# Patient Record
Sex: FEMALE | Race: WHITE | NOT HISPANIC OR LATINO | URBAN - METROPOLITAN AREA
[De-identification: names, ages, dates, MRNs, and addresses within clinical notes are randomized per-mention and may not be internally consistent; named-entity substitution may affect disease eponyms.]

---
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
## 2020-01-01 ENCOUNTER — EXTERNAL RECORD (OUTPATIENT)
Dept: OTHER | Age: 20
End: 2020-01-01

## 2020-07-03 ENCOUNTER — WALK IN (OUTPATIENT)
Dept: URGENT CARE | Age: 20
End: 2020-07-03

## 2020-07-03 VITALS
HEIGHT: 60 IN | SYSTOLIC BLOOD PRESSURE: 134 MMHG | BODY MASS INDEX: 50.26 KG/M2 | DIASTOLIC BLOOD PRESSURE: 88 MMHG | HEART RATE: 106 BPM | OXYGEN SATURATION: 98 % | WEIGHT: 256 LBS | RESPIRATION RATE: 16 BRPM
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
## 2020-07-03 DIAGNOSIS — S61.259A CAT BITE OF FINGER, INITIAL ENCOUNTER: Primary | ICD-10-CM

## 2020-07-03 PROCEDURE — 90471 IMMUNIZATION ADMIN: CPT | Performed by: PHYSICIAN ASSISTANT

## 2020-07-03 PROCEDURE — 99202 OFFICE O/P NEW SF 15 MIN: CPT | Performed by: PHYSICIAN ASSISTANT

## 2020-07-03 PROCEDURE — 90715 TDAP VACCINE 7 YRS/> IM: CPT | Performed by: PHYSICIAN ASSISTANT

## 2020-07-03 RX ORDER — NORTRIPTYLINE HYDROCHLORIDE 50 MG/1
50 CAPSULE ORAL NIGHTLY
COMMUNITY

## 2020-07-03 RX ORDER — ESCITALOPRAM OXALATE 20 MG/1
20 TABLET ORAL DAILY
COMMUNITY

## 2020-07-03 RX ORDER — AMOXICILLIN AND CLAVULANATE POTASSIUM 875; 125 MG/1; MG/1
TABLET, FILM COATED ORAL
Qty: 10 TABLET | Refills: 0 | Status: SHIPPED | OUTPATIENT
Start: 2020-07-03

## 2020-07-23 ENCOUNTER — CLINICAL ABSTRACT (OUTPATIENT)
Dept: HEALTH INFORMATION MANAGEMENT | Age: 20
End: 2020-07-23

## 2022-02-12 VITALS
HEART RATE: 76 BPM | BODY MASS INDEX: 37.5 KG/M2 | BODY MASS INDEX: 36.83 KG/M2 | SYSTOLIC BLOOD PRESSURE: 124 MMHG | TEMPERATURE: 98.1 F | WEIGHT: 191 LBS | WEIGHT: 187 LBS | TEMPERATURE: 98.3 F | HEIGHT: 60 IN | HEART RATE: 88 BPM | DIASTOLIC BLOOD PRESSURE: 72 MMHG

## 2022-02-12 VITALS — SYSTOLIC BLOOD PRESSURE: 128 MMHG | DIASTOLIC BLOOD PRESSURE: 88 MMHG | HEART RATE: 80 BPM | WEIGHT: 185 LBS

## 2022-02-16 NOTE — PROGRESS NOTES
Patient:   JAISON ARRIAZA            MRN: [id number]            FIN: [id number]               Age:   16 years     Sex:  Female     :  2000   Associated Diagnoses:   Abdominal pain   Author:   Donna Rg      Chief Complaint   3/30/2017 3:17 PM CDT    Still having abd. pain.Feels that pain is a worsening. Pain with voiding and even some nausea.  Pain worse3 when having period.        History of Present Illness   3/10/17  she would also like evaluation for possible ovarian cyst.  tells me she was seen at her primary clinic about one yr ago with LLQ abdominal pain, pelvic US confirmed Left ovarian cyst, was placed on birth control about 3 months after US, she is not certain this has helped, her pain occurs throughout cycle but is most intense during menses, she occasionally will use 1-2 ibuprofen to help but does not feel this controls it well,  last sexual activity was over 6 months ago, denies diarrhea, no dysuria    at end of this visit a pelvic US was ordered:     3/30/17  PPC with ongoing and worsening lower abdominal pelvic pain, reassured US was normal  she would like to further work this issue up, again no diarrhea, constipation, dysuria or hx of pain with intercourse.  She correlated her pain more with her menses   she has had no fevers, no rash, no recent travel  she has no known hx of celiac disease and no known fy hx of GI cancers  she has not had weight loss      Review of Systems   Constitutional:  Negative.    Ear/Nose/Mouth/Throat:  Negative.    Respiratory:  Negative.    Cardiovascular:  Negative.    Gastrointestinal:  Negative except as documented in history of present illness.    Genitourinary:  Negative.    Hematology/Lymphatics:  Negative.    Endocrine:  Negative.    Immunologic:  Negative.    Musculoskeletal:  Negative.    Integumentary:  Negative.    Neurologic:  Negative.    Psychiatric:  Negative.       Health Status   Allergies:    Allergic Reactions (Selected)  No Known  Medication Allergies   Medications:  (Selected)   Prescriptions  Prescribed  citalopram 20 mg oral tablet: 1 tab(s) ( 20 mg ), PO, Daily, # 90 tab(s), 1 Refill(s), Type: Maintenance, Pharmacy: [id number], 1 tab(s) po daily  ibuprofen 800 mg oral tablet: 1 tab(s) ( 800 mg ), PO, TID, # 30 tab(s), 0 Refill(s), Type: Maintenance, Pharmacy: [id number], 1 tab(s) po tid  Documented Medications  Documented  Dasetta 1/35 oral tablet: 1 tab(s), po, daily, 0 Refill(s), Type: Maintenance   Problem list:    All Problems  Obesity / SNOMED CT 4738204345 / Probable      Histories   Past Medical History:    No active or resolved past medical history items have been selected or recorded.      Physical Examination   Last Menstrual Period: 3/15/2017     Vital Signs   3/30/2017 3:17 PM CDT Temperature Temporal 98.1 DegF    Apical Heart Rate 88 bpm    Peripheral Pulse Rate 68 bpm      Measurements from flowsheet : Measurements   3/30/2017 3:17 PM CDT Height Measured - Standard 59.75 in    Weight Measured - Standard 191 lb    BSA 1.91 m2    Body Mass Index 37.61 kg/m2    Body Mass Index Percentile 98.27      General:  Alert and oriented, No acute distress.    HENT:  Normocephalic, Tympanic membranes are clear, Normal hearing.    Neck:  Supple, Non-tender, No lymphadenopathy.    Respiratory:  Lungs are clear to auscultation, Respirations are non-labored.    Cardiovascular:  Normal rate, Regular rhythm, No murmur, No edema.    Gastrointestinal:  Soft, Non-distended, Normal bowel sounds, No organomegaly, diffuse lower abdominal tenderness bilaterally.    Genitourinary:  No costovertebral angle tenderness.    Lymphatics:  No lymphadenopathy neck, axilla, groin.    Musculoskeletal:  Normal range of motion, Normal strength, No swelling.    Integumentary:  Warm, Dry, Pink.    Neurologic:  Alert, Oriented.    Psychiatric:  Cooperative, Appropriate mood & affect.       Impression and Plan   Diagnosis     Abdominal pain  (GMJ40-NM R10.9).     Plan:  reluctantly I am ordering an abdominal CT.  Melany is concerned the US missed something.  I am concerned she has IBS  I have cautioned her that even CTs are not perfect and that they do expose her to radiation  If this is negative and she continues to have abdominal pain i will consider celiac testing, drawing liver enzymes or I can refer her to [place] [facility] for further evaluation  Melany does not want to trial omeprazole first she would prefer the imaging   .

## 2022-02-16 NOTE — PROGRESS NOTES
Patient:   MELANY ARRIAZA            MRN: [id number]            FIN: [id number]               Age:   16 years     Sex:  Female     :  2000   Associated Diagnoses:   Anxiety; Pelvic pain; Personal history of ovarian cyst   Author:   Donna Rg      Visit Information      Primary Care Provider (PCP):  Not recorded.      Chief Complaint   3/10/2017 1:22 PM CST    f/u depression/anxiety from 2/10. Started citalopram and things going well. Also concerns with possible bilateral ovarian cysts. Has had an u/s done at home clinic and told L ovary cyst.      History of Present Illness   [facility]/10/17[facility] to discuss anxiety and depression  had this in High school and even threatened to kill herself  now at college she has noticed it has worsened, no worried about self harm  has had a hard time focusing, doing well in school but she often has a hard time concentrating, denies ADD  neither mom and dad have depression or anxiety  room mate problems, not getting along and Melany has threatened to move out but parents are forcing her to remain in room and deal with relationship  GAD7=11  PHQ9=17   started on citalopram at end of this visit    3/10/17:  PPC to discussed citalopram response,  feels much better on it, using 20mg daily, would like to remain on this, no altered thoughts, no thoughts of suicide  PHQ9=5  she would also like evaluation for possible ovarian cyst.  tells me she was seen at her primary clinic about one yr ago with LLQ abdominal pain, pelvic US confirmed Left ovarian cyst, was placed on birth control about 3 months after US, she is not certain this has helped, her pain occurs throughout cycle but is most intense during menses, she occasionally will use 1-2 ibuprofen to help but does not feel this controls it well,  last sexual activity was over 6 months ago, denies diarrhea, no dysuria         Review of Systems   Constitutional:  Negative.    Ear/Nose/Mouth/Throat:  Negative.    Respiratory:  Negative.     Cardiovascular:  Negative.    Gastrointestinal:  Negative.    Genitourinary:  Negative except as documented in history of present illness.    Hematology/Lymphatics:  Negative.    Immunologic:  Negative.    Musculoskeletal:  Negative.    Integumentary:  Negative.    Neurologic:  Negative.    Psychiatric:  Anxiety, Depression, No fabiano, Not suicidal, Not delusional, No hallucinations.       Health Status   Allergies:    Allergic Reactions (Selected)  No Known Medication Allergies   Medications:  (Selected)   Prescriptions  Prescribed  citalopram 10 mg oral tablet: See Instructions, Instructions: start with one tablet daily then increse to two tablets in one week, # 60 tab(s), 1 Refill(s), Type: Maintenance, Pharmacy: [facility] #[id number], start with one tablet daily then increse to two tablets in one week  Documented Medications  Documented  Dasetta 1/35 oral tablet: 1 tab(s), po, daily, 0 Refill(s), Type: Maintenance   Problem list:    All Problems  Obesity / SNOMED CT 1655619577 / Probable      Histories   Past Medical History:    No active or resolved past medical history items have been selected or recorded.   Family History:    Diabetes  Mother  Grandmother (P)  Grandfather (P)     Social History:        Alcohol Assessment            Never      Tobacco Assessment            Never smoker      Substance Abuse Assessment            Never      Employment and Education Assessment            Student                     Comments:                      10/19/2016 - Adi Messer CMA                     Freshman at [employer]        Physical Examination   Vital Signs   3/10/2017 1:22 PM CST Temperature Tympanic 98.3 DegF    Peripheral Pulse Rate 76 bpm    Pulse Site Radial artery    HR Method Manual    Systolic Blood Pressure 124 mmHg    Diastolic Blood Pressure 72 mmHg    Mean Arterial Pressure 89 mmHg    BP Site Right arm    BP Method Manual      General:  Alert and oriented, No acute distress.    Eye:  Pupils are equal,  round and reactive to light.    HENT:  Normocephalic.    Neck:  Supple.    Respiratory:  Respirations are non-labored.    Cardiovascular:  Regular rhythm.    Gastrointestinal:  Non-distended, Normal bowel sounds, No organomegaly.         Abdomen: Tenderness, Mass ( RLQ and LLQ abdominal tenderness with palpation   no internal exam done today ).    Musculoskeletal:  Normal range of motion.    Integumentary:  Warm, Dry, Pink.    Neurologic:  Alert, Oriented, Normal sensory, No focal deficits.    Psychiatric:  Cooperative, Appropriate mood & affect, Normal judgment, Non-suicidal.       Review / Management   Radiology results   Ultrasound, ordered pelvic US      Impression and Plan   Diagnosis     Anxiety (VXT99-GZ F41.9).     Pelvic pain (GQG19-FN R10.2).     Personal history of ovarian cyst (WSY00-UH Z87.42).     Patient Instructions:       Counseled: Patient, Regarding diagnosis, Regarding treatment, Regarding medications, Verbalized understanding.    Summary:  will refill citalopram at 20mg dose.  will recheck in 6 months for anxiety/depression or sooner if needed  will repeat pelvic US.  I am not convinced she has more ovarian cysts given that she is having increased discomfort during menses and has remained on COCs consistently  gave Rx for ibuprofen at 800mg to be used during menses up to three times a day, cautioned her she is not to remain on high dose NSAIDs all month long and to take with food  will screen urine for possible UTI, if ultrasound is normal and pain does not improve with NSAID therapy will look at full gyn exam.    Orders     Orders (Selected)   Prescriptions  Prescribed  citalopram 20 mg oral tablet: 1 tab(s) ( 20 mg ), PO, Daily, # 90 tab(s), 1 Refill(s), Type: Maintenance, Pharmacy: [facility] #[id number], 1 tab(s) po daily  ibuprofen 800 mg oral tablet: 1 tab(s) ( 800 mg ), PO, TID, # 30 tab(s), 0 Refill(s), Type: Maintenance, Pharmacy: [facility] #[id number], 1 tab(s) po tid.

## 2022-02-16 NOTE — PROGRESS NOTES
Patient:   MELANY ARRIAZA            MRN: [id number]            FIN: [id number]               Age:   16 years     Sex:  Female     :  2000   Associated Diagnoses:   Depression   Author:   Donna Rg      Visit Information      Primary Care Provider (PCP):  Not recorded.      Chief Complaint   2/10/2017 10:49 AM CST   Pt here to discuss depression. Sx ongoing for a couple years but never treated for.      History of Present Illness   PPC to discuss anxiety and depression  had this in High school and even threatened to kill herself  now at college she has noticed it has worsened, no worried about self harm  has had a hard time focusing, doing well in school but she often has a hard time concentrating, denies ADD  neither mom and dad have depression or anxiety  room mate problems, not getting along and Melany has threatened to move out but parents are forcing her to remain in room and deal with relationship  GAD7=11  PHQ9=17      Review of Systems   Constitutional:  Negative.    Ear/Nose/Mouth/Throat:  Negative.    Respiratory:  Negative.    Cardiovascular:  Negative.    Gastrointestinal:  Negative.    Hematology/Lymphatics:  Negative.    Immunologic:  Negative.    Musculoskeletal:  Negative.    Integumentary:  Negative.    Neurologic:  Negative.    Psychiatric:  Anxiety, Depression, No fabiano, Not suicidal, Not delusional, No hallucinations.       Health Status   Allergies:    Allergic Reactions (Selected)  No Known Medication Allergies   Medications:  (Selected)   Documented Medications  Documented  Dasetta  oral tablet: 1 tab(s), po, daily, 0 Refill(s), Type: Maintenance   Problem list:    All Problems  Obesity / SNOMED CT 7597501484 / Probable      Histories   Past Medical History:    No active or resolved past medical history items have been selected or recorded.   Family History:    Diabetes  Mother  Grandmother (P)  Grandfather (P)     Social History:        Alcohol Assessment            Never       Tobacco Assessment            Never smoker      Substance Abuse Assessment            Never      Employment and Education Assessment            Student                     Comments:                      10/19/2016 - Ayde BURKS, Adi                     Freshman at [employer]        Physical Examination   Vital Signs   2/10/2017 10:49 AM CST Peripheral Pulse Rate 80 bpm    Pulse Site Radial artery    HR Method Manual    Systolic Blood Pressure 128 mmHg    Diastolic Blood Pressure 88 mmHg    Mean Arterial Pressure 101 mmHg    BP Site Right arm    BP Method Manual      General:  Alert and oriented, No acute distress.    Eye:  Pupils are equal, round and reactive to light.    HENT:  Normocephalic.    Neck:  Supple.    Musculoskeletal:  Normal range of motion.    Integumentary:  Warm, Dry, Pink.    Neurologic:  Alert, Oriented, Normal sensory, No focal deficits.    Psychiatric:  Cooperative, Appropriate mood & affect, Normal judgment, Non-suicidal.       Impression and Plan   Diagnosis     Depression (IAM28-GT F32.9).     Patient Instructions:       Counseled: Patient, Regarding diagnosis, Regarding treatment, Regarding medications, Verbalized understanding.    Summary:  will use citalopram and see her back in 6 weeks to asses response, she is under verbal contract not to cause self harm or harm to others   I spent 25 minutes with Melany today discussing possible side effects, treatment options, goal and approach  she was given ample time to ask questions.    Orders     Orders (Selected)   Prescriptions  Prescribed  citalopram 10 mg oral tablet: See Instructions, Instructions: start with one tablet daily then increse to two tablets in one week, # 60 tab(s), 1 Refill(s), Type: Maintenance, Pharmacy: [id number], start with one tablet daily then increse to two tablets in one week.

## 2025-03-25 ENCOUNTER — WALK IN (OUTPATIENT)
Dept: URGENT CARE | Age: 25
End: 2025-03-25

## 2025-03-25 VITALS
BODY MASS INDEX: 52.63 KG/M2 | DIASTOLIC BLOOD PRESSURE: 63 MMHG | HEART RATE: 104 BPM | WEIGHT: 269.5 LBS | TEMPERATURE: 98.1 F | OXYGEN SATURATION: 100 % | RESPIRATION RATE: 16 BRPM | SYSTOLIC BLOOD PRESSURE: 132 MMHG

## 2025-03-25 DIAGNOSIS — J06.9 UPPER RESPIRATORY TRACT INFECTION, UNSPECIFIED TYPE: ICD-10-CM

## 2025-03-25 DIAGNOSIS — B37.2 CANDIDAL INTERTRIGO: Primary | ICD-10-CM

## 2025-03-25 PROCEDURE — 99204 OFFICE O/P NEW MOD 45 MIN: CPT | Performed by: PHYSICIAN ASSISTANT

## 2025-03-25 RX ORDER — GALCANEZUMAB 120 MG/ML
INJECTION, SOLUTION SUBCUTANEOUS
COMMUNITY
Start: 2025-01-30

## 2025-03-25 RX ORDER — GABAPENTIN 300 MG/1
CAPSULE ORAL
COMMUNITY
Start: 2025-03-10

## 2025-03-25 RX ORDER — BENZONATATE 200 MG/1
200 CAPSULE ORAL 3 TIMES DAILY PRN
Qty: 15 CAPSULE | Refills: 0 | Status: SHIPPED | OUTPATIENT
Start: 2025-03-25

## 2025-03-25 RX ORDER — CLOTRIMAZOLE 1 %
1 CREAM (GRAM) TOPICAL 2 TIMES DAILY
Qty: 30 G | Refills: 0 | Status: SHIPPED | OUTPATIENT
Start: 2025-03-25

## 2025-03-25 RX ORDER — LEVONORGESTREL 52 MG/1
INTRAUTERINE DEVICE INTRAUTERINE
COMMUNITY

## 2025-03-25 RX ORDER — NORTRIPTYLINE HYDROCHLORIDE 50 MG/1
1 CAPSULE ORAL AT BEDTIME
COMMUNITY
Start: 2025-01-31 | End: 2025-03-25 | Stop reason: SDUPTHER